# Patient Record
Sex: FEMALE | Race: WHITE | NOT HISPANIC OR LATINO | Employment: OTHER | URBAN - METROPOLITAN AREA
[De-identification: names, ages, dates, MRNs, and addresses within clinical notes are randomized per-mention and may not be internally consistent; named-entity substitution may affect disease eponyms.]

---

## 2025-02-07 ENCOUNTER — OFFICE VISIT (OUTPATIENT)
Dept: OTOLARYNGOLOGY | Facility: CLINIC | Age: 75
End: 2025-02-07
Payer: MEDICARE

## 2025-02-07 VITALS — WEIGHT: 151 LBS | BODY MASS INDEX: 29.64 KG/M2 | HEIGHT: 60 IN

## 2025-02-07 DIAGNOSIS — R44.8 FACIAL PRESSURE: ICD-10-CM

## 2025-02-07 DIAGNOSIS — J34.89 NASAL AND SINUS DISCHARGE: ICD-10-CM

## 2025-02-07 DIAGNOSIS — J34.89 NASAL OBSTRUCTION: ICD-10-CM

## 2025-02-07 DIAGNOSIS — J01.80 OTHER SUBACUTE SINUSITIS: Primary | ICD-10-CM

## 2025-02-07 PROBLEM — R94.31 ELECTROCARDIOGRAM ABNORMAL: Status: ACTIVE | Noted: 2022-05-11

## 2025-02-07 PROBLEM — E03.9 HYPOTHYROIDISM: Status: ACTIVE | Noted: 2020-01-27

## 2025-02-07 PROBLEM — I51.9 HEART DISEASE: Status: ACTIVE | Noted: 2023-02-07

## 2025-02-07 PROBLEM — M41.9 SCOLIOSIS: Status: ACTIVE | Noted: 2025-02-07

## 2025-02-07 PROBLEM — G44.209 TENSION-TYPE HEADACHE: Status: ACTIVE | Noted: 2020-02-20

## 2025-02-07 PROBLEM — E66.3 OVERWEIGHT WITH BODY MASS INDEX (BMI) 25.0-29.9: Status: ACTIVE | Noted: 2019-07-25

## 2025-02-07 PROBLEM — M41.55 SCOLIOSIS OF THORACOLUMBAR REGION DUE TO DEGENERATIVE DISEASE OF SPINE IN ADULT: Status: ACTIVE | Noted: 2025-02-07

## 2025-02-07 PROBLEM — M48.061 LUMBAR STENOSIS: Status: ACTIVE | Noted: 2025-02-07

## 2025-02-07 PROBLEM — J45.20 INTERMITTENT ASTHMA (HHS-HCC): Status: ACTIVE | Noted: 2020-01-27

## 2025-02-07 PROBLEM — I05.9 MITRAL VALVE DISORDER: Status: ACTIVE | Noted: 2022-06-22

## 2025-02-07 PROBLEM — K21.9 CHRONIC GERD: Status: ACTIVE | Noted: 2025-02-07

## 2025-02-07 PROBLEM — M19.90 ARTHRITIS: Status: ACTIVE | Noted: 2025-02-07

## 2025-02-07 PROBLEM — F41.9 ANXIETY: Status: ACTIVE | Noted: 2020-01-27

## 2025-02-07 PROBLEM — F32.5 MAJOR DEPRESSION IN REMISSION (CMS-HCC): Status: ACTIVE | Noted: 2020-01-27

## 2025-02-07 PROBLEM — M85.80 OSTEOPENIA: Status: ACTIVE | Noted: 2020-02-04

## 2025-02-07 PROBLEM — M43.9 ACQUIRED SPINAL DEFORMITY: Status: ACTIVE | Noted: 2025-02-07

## 2025-02-07 PROBLEM — R93.6 ABNORMAL FINDINGS ON DIAGNOSTIC IMAGING OF LIMBS: Status: ACTIVE | Noted: 2023-02-07

## 2025-02-07 PROBLEM — G25.81 RESTLESS LEGS SYNDROME (RLS): Status: ACTIVE | Noted: 2020-01-27

## 2025-02-07 PROBLEM — R06.83 SNORING: Status: ACTIVE | Noted: 2019-01-15

## 2025-02-07 PROBLEM — E55.9 VITAMIN D DEFICIENCY: Status: ACTIVE | Noted: 2020-01-27

## 2025-02-07 PROBLEM — F32.A DEPRESSION: Status: ACTIVE | Noted: 2025-02-07

## 2025-02-07 PROBLEM — I10 ESSENTIAL HYPERTENSION: Status: ACTIVE | Noted: 2023-02-07

## 2025-02-07 PROBLEM — G47.33 OBSTRUCTIVE SLEEP APNEA SYNDROME: Status: ACTIVE | Noted: 2020-01-27

## 2025-02-07 PROBLEM — G47.10 HYPERSOMNIA: Status: ACTIVE | Noted: 2018-11-09

## 2025-02-07 PROBLEM — I44.7 LEFT BUNDLE BRANCH BLOCK: Status: ACTIVE | Noted: 2022-05-11

## 2025-02-07 PROBLEM — F33.1 MODERATE RECURRENT MAJOR DEPRESSION: Status: ACTIVE | Noted: 2020-01-27

## 2025-02-07 PROCEDURE — 31231 NASAL ENDOSCOPY DX: CPT | Performed by: NURSE PRACTITIONER

## 2025-02-07 PROCEDURE — 99204 OFFICE O/P NEW MOD 45 MIN: CPT | Performed by: NURSE PRACTITIONER

## 2025-02-07 PROCEDURE — 1159F MED LIST DOCD IN RCRD: CPT | Performed by: NURSE PRACTITIONER

## 2025-02-07 PROCEDURE — 1157F ADVNC CARE PLAN IN RCRD: CPT | Performed by: NURSE PRACTITIONER

## 2025-02-07 PROCEDURE — 1160F RVW MEDS BY RX/DR IN RCRD: CPT | Performed by: NURSE PRACTITIONER

## 2025-02-07 PROCEDURE — 1036F TOBACCO NON-USER: CPT | Performed by: NURSE PRACTITIONER

## 2025-02-07 RX ORDER — LORATADINE 10 MG/1
1 TABLET ORAL DAILY
COMMUNITY

## 2025-02-07 RX ORDER — PANTOPRAZOLE SODIUM 40 MG/1
1 TABLET, DELAYED RELEASE ORAL DAILY
COMMUNITY

## 2025-02-07 RX ORDER — MAGNESIUM OXIDE/MAG AA CHELATE 300 MG
1 CAPSULE ORAL DAILY
COMMUNITY

## 2025-02-07 RX ORDER — FLUOXETINE 20 MG/1
1 TABLET ORAL DAILY
COMMUNITY

## 2025-02-07 RX ORDER — BUDESONIDE AND FORMOTEROL FUMARATE DIHYDRATE 80; 4.5 UG/1; UG/1
2 AEROSOL RESPIRATORY (INHALATION)
COMMUNITY

## 2025-02-07 RX ORDER — AMOXICILLIN AND CLAVULANATE POTASSIUM 875; 125 MG/1; MG/1
1 TABLET, FILM COATED ORAL 2 TIMES DAILY
Qty: 20 TABLET | Refills: 0 | Status: SHIPPED | OUTPATIENT
Start: 2025-02-07 | End: 2025-02-07 | Stop reason: WASHOUT

## 2025-02-07 RX ORDER — ALBUTEROL SULFATE 0.83 MG/ML
2.5 SOLUTION RESPIRATORY (INHALATION) EVERY 4 HOURS PRN
COMMUNITY

## 2025-02-07 RX ORDER — FLUTICASONE PROPIONATE 50 MCG
1 SPRAY, SUSPENSION (ML) NASAL DAILY
COMMUNITY

## 2025-02-07 RX ORDER — LORATADINE PSEUDOEPHEDRINE SULFATE 10; 240 MG/1; MG/1
1 TABLET, EXTENDED RELEASE ORAL DAILY PRN
COMMUNITY
Start: 2019-01-29

## 2025-02-07 RX ORDER — OMEPRAZOLE 20 MG/1
1 CAPSULE, DELAYED RELEASE ORAL EVERY 24 HOURS
COMMUNITY
Start: 2016-01-13

## 2025-02-07 RX ORDER — ALBUTEROL SULFATE 90 UG/1
2 INHALANT RESPIRATORY (INHALATION) 4 TIMES DAILY PRN
COMMUNITY

## 2025-02-07 RX ORDER — TOPIRAMATE 25 MG/1
1 TABLET ORAL 2 TIMES DAILY
COMMUNITY

## 2025-02-07 RX ORDER — LORAZEPAM 1 MG/1
1 TABLET ORAL EVERY 6 HOURS PRN
COMMUNITY

## 2025-02-07 RX ORDER — FLUTICASONE PROPIONATE 50 MCG
2 SPRAY, SUSPENSION (ML) NASAL DAILY
Qty: 16 G | Refills: 3 | Status: SHIPPED | OUTPATIENT
Start: 2025-02-07 | End: 2025-03-09

## 2025-02-07 RX ORDER — MULTIVIT-MIN/FA/LYCOPEN/LUTEIN .4-300-25
1 TABLET ORAL DAILY
COMMUNITY
Start: 2019-01-29

## 2025-02-07 RX ORDER — FERROUS SULFATE 325(65) MG
1 TABLET ORAL DAILY
COMMUNITY
Start: 2019-01-29

## 2025-02-07 RX ORDER — AMOXICILLIN AND CLAVULANATE POTASSIUM 875; 125 MG/1; MG/1
1 TABLET, FILM COATED ORAL 2 TIMES DAILY
Qty: 20 TABLET | Refills: 0 | Status: SHIPPED | OUTPATIENT
Start: 2025-02-07 | End: 2025-02-17

## 2025-02-07 RX ORDER — ROPINIROLE 1 MG/1
1 TABLET, FILM COATED ORAL NIGHTLY
COMMUNITY

## 2025-02-07 RX ORDER — HYDROCORTISONE 25 MG/G
CREAM TOPICAL
COMMUNITY
Start: 2024-06-05

## 2025-02-07 RX ORDER — LOSARTAN POTASSIUM 25 MG/1
1 TABLET ORAL DAILY
COMMUNITY

## 2025-02-07 RX ORDER — GLUCOSAMINE/CHONDR SU A SOD 750-600 MG
TABLET ORAL
COMMUNITY
Start: 2019-01-29

## 2025-02-07 NOTE — PROGRESS NOTES
Subjective   Patient ID: Kenyatta Domínguez is a 74 y.o. female who presents for No chief complaint on file..  HPI  Kenyatta Domínguez is a 74 y.o. old female   presenting with complaints of sinus and nose problems that began as early as January of this year.   The patient describes the sinus/nose problems as gradual onset of facial pressure, cough, nasal drainage (anterior, posterior, bright yellow to green, worse with bending over). Currently, drainage is yellow and blood tinged following doxycycline. She has mild nasal obstruction at night. Patient denies facial pain, periorbital edema, throat clearing, loss of taste, and loss of smell. The patient denies epistaxis. The patient has taken afrin at night to alleviate the problem. The medication afrin has helped. The patient has not tried nasal saline irrigations. Does not have a history of allergic rhinitis or allergies. They deny a history of aspirin sensitivity. They report 3-4 sinus infections per year requiring antibiotic treatment. Most recent antibiotic usage includes: Doxycycline x 7 days in early January. Then again doxycycline x 7 days finished 2 days ago. She did have a prednisone taper that she has 2 days remaining and a kenalog shot yesterday.    History of prior nasal/sinus surgery or procedure: Denies    I personally reviewed the patients CT scan results. I discussed the results personally with the patient. The following findings were discussed: 1/29/23: CT Head: Read reveals that paranasal sinuses are grossly clear.     Review of Systems  Review of systems is negative for constitutional, ophthalmological, cardiac, pulmonary, renal, gastrointestinal, musculoskeletal, mental health, endocrine, or neurologic disorders (except as listed in the HPI, PMH, and Problem List).     Objective   Physical Exam  CONSTITUTIONAL: Vital signs reviewed. Patient appears well developed and well nourished.   GENERAL: this is a healthy appearing female who appears stated age.  The patient is alert and appropriately verbally conversant with hoarseness. This patient is in no apparent distress.   FACE: The face was inspected and no cutaneous masses or lesions were visualized. There was no erythema or edema noted. Facial movement was symmetric. No skin lesions were detected. There was no sinus tenderness elicited. TMJ crepitus absent.   EYES: Extra-ocular muscle function was intact. No nystagmus was observed. Pupils were equal.   CRANIAL NERVES: Cranial nerves II, III, IV, and VI were noted to be intact via extra-ocular muscle movement testing. Cranial nerve VII noted to be intact and symmetric by facial movement. Cranial nerves IX and X noted to be intact by gag reflex and palatal movement. Cranial nerve XII noted to be intact by active and symmetric tongue movement.   NOSE: Examination of the nose revealed the nasal dorsum to be midline. Intranasal exam reveals the septum is deviated left. The inferior turbinates were minimal. No masses, polyps, mucopus, or other lesion on anterior rhinoscopy. See below procedure note as applicable for further exam.  ORAL CAVITY: Examination of the oral cavity revealed no mass lesions nor infection. The palate was noted to be intact. The tongue exhibited normal mobility. Mucosa was moist without lesion. The lips were free of lesion. Gums were free of inflammation. Dentition: normal without obvious infection or inflammation  OROPHARYNX: The oral pharynx was free of mass lesion or mucosal abnormality. The palate was noted to be without lesion. The uvula was normal appearing. The tonsils were Normal.  EARS: Examination of the ears revealed that the auricles were normally formed with no lesions. The external auditory canals were normal. The tympanic membranes were intact.  There is no inflammation visualized.   NECK: Visualization and palpation of the neck revealed no mass lesions. No skin lesions or inflammatory processes were detected. The cervical  musculature was normal to palpation.   CERVICAL LYMPHATICS: There were no palpable lymph nodes in the posterior triangle, submandibular triangle, jugulodigastric region, or central neck.  RESPIRATORY: Normal inspiration and expiration and chest wall expansion, no use of accessory muscles to breathe, no stridor.  NEUROLOGICAL: Patient is ambulatory without assist. Mentation is clear. Answering questions appropriately.     Nasal / sinus endoscopy    Date/Time: 2/7/2025 10:12 AM    Performed by: KEVIN Contreras  Authorized by: KEVIN Contreras    Consent:     Consent obtained:  Verbal    Consent given by:  Patient    Risks discussed:  Bleeding, infection and pain    Alternatives discussed:  No treatment, observation and delayed treatment  Procedure details:     Indications: assessment of airway and sino-nasal symptoms      Medication:  Afrin and [place]ich[place]-Synephrine 2%    Instrument: flexible fiberoptic nasal endoscope      Scope location: bilateral nare    Post-procedure details:     Patient tolerance of procedure:  Tolerated well, no immediate complications  Comments:      Findings: After topical decongestion with decongestant and anesthetic spray, nasal endoscopy was performed using an endoscope. The septum was mild left dev. The inferior turbinates were minimal.  The middle turbinates appeared healthy, the middle meatus is free of purulence, masses, lesions or polyps on the right, although there is a clear drainage here. On the left, there is a thick purulent drainage from the middle meatus, draining down the posterior pharynx. The superior meatus and sphenoethmoid recess are clear bilaterally. The nasal passageway is patent. The nasopharynx was clear. There were no complications and the patient tolerated the procedure well.        Assessment/Plan     ASSESSMENT:  Kenyatta Domínguez is a 74 y.o. year old female with symptoms of nasal drainage, nasal obstruction and facial pressure and clinical  findings consistent with acute sinusitis. Patient also noted to have a mild left nasal septal deviation. Recommended a 10 day course of augmentin, saline irrigations and fluticasone.      PLAN:  1) Nasal Endoscopy today.Findings: mild left dev, purulent drainage from left middle meatus, clear drainage from right middle meatus.  2) I personally reviewed the patients CT scan results. I discussed the results personally with the patient. The following findings were discussed: 1/29/23: CT Head: Read reveals that paranasal sinuses are grossly clear.   Reassurance and counseling provided to patient and his condition was extensively discussed including as noted below:  3) We will start the patient on antibiotics to treat current infection. Rx for Augmentin BID x 10 days. Advised to take with food and discontinue for adverse effects. Highly recommended use of probiotic.  4) Patient instructed to begin saline irrigations, ideally 1-2 times daily. Discussed appropriate technique and provided with a sample bottle.   5) Patient was instructed to start steroid nasal spray after the irrigations.  6) The patient will follow up in 1 month or sooner if needed to determine further steps in care.

## 2025-02-07 NOTE — PATIENT INSTRUCTIONS
Today you were evaluated by Melvi Ghotra CNP.    Please follow-up in 4 weeks or sooner if needed. If you have any questions or concerns, please contact my office at [phone].     Begin Augmentin, twice daily, x 10 days. Take with a probiotic and yogurt. Discontinue for adverse effects.    Saline Irrigations:  The Benefits:  When you irrigate, the isotonic saline (salt water) washes mucous, crusts, and other debris from your nose (allergens, irritants from the environment, etc) that could be contributing to your nasal symptoms.     Instructions: Stand over the sink with your head forward over the sink or in the bathtub to prevent water from going down into your throat. The goal is to get the irrigation back out of the opposite nostril after irrigation. Irrigate each side of the nose with 4 oz (about 120 milliliters) 1-3 times/day. You may buy the salt packets that come with the [employer] irrigation bottle or use recipe provided below to make your own nasal saline. Irrigate each side of the nose with mild force/squeezing of bottle. If you feel like the solution is going into your ears adjust your head angle or use less force with the bottle. The first couple times you use the solution your nose may burn a bit but this will go away with time.    Recipe to Make Own Nasal Saline Solution   Mix 8 oz of tap water (be sure to boil it then let it cool down) or distilled water with 1/2 tsp of baking soda and 1/2 tsp of table salt and shake bottle to dissolve.    · If you are using a steroid nasal spray in addition to the irrigation, irrigate first, then use the topical steroid spray otherwise you will wash the steroid out of your nose with the irrigation    Optimal use is twice per day  · Irrigations have been proven to be more effective compared with salt water spray   Nasal irrigations performed with a large volume and delivered with low positive pressure are more effective than saline sprays over an 8-week period  for treatment of chronic nasal and sinus symptoms1    1.  María ELLIS et al Nasal saline irrigations for the symptoms of acute and chronic rhinosinusitis.  Arch OtolaryngolHeadNeckSurg. 2007;133(11):7176-6818.    MEDICATED NASAL SPRAY INSTRUCTIONS  Please take the prescribed nasal spray as directed. BE SURE TO POINT THE SPRAY TOWARDS THE CORNER OF THE EYE ON THE SAME SIDE NOSTRIL. This will ensure you are treating the appropriate parts of your nose that are swollen or inflamed.  This medication will take upwards of one month before you notice full benefit. You MUST use it every day for it to be effective.

## 2025-02-07 NOTE — LETTER
February 7, 2025     Niurka Littlejohn MD  [address] [address]    Patient: Kenyatta Domínguez   YOB: 1950   Date of Visit: 2/7/2025       Dear Dr. Niurka Littlejohn MD:    Thank you for referring Kenyatta Domínguez to me for evaluation. Below are my notes for this consultation.  If you have questions, please do not hesitate to call me. I look forward to following your patient along with you.       Sincerely,     Melvi Ghotra, APRN-CNP      CC: No Recipients  ______________________________________________________________________________________    Subjective  Patient ID: Kenyatta Domínguez is a 74 y.o. female who presents for No chief complaint on file..  HPI  Kenyatta Domínguez is a 74 y.o. old female   presenting with complaints of sinus and nose problems that began as early as January of this year.   The patient describes the sinus/nose problems as gradual onset of facial pressure, cough, nasal drainage (anterior, posterior, bright yellow to green, worse with bending over). Currently, drainage is yellow and blood tinged following doxycycline. She has mild nasal obstruction at night. Patient denies facial pain, periorbital edema, throat clearing, loss of taste, and loss of smell. The patient denies epistaxis. The patient has taken afrin at night to alleviate the problem. The medication afrin has helped. The patient has not tried nasal saline irrigations. Does not have a history of allergic rhinitis or allergies. They deny a history of aspirin sensitivity. They report 3-4 sinus infections per year requiring antibiotic treatment. Most recent antibiotic usage includes: Doxycycline x 7 days in early January. Then again doxycycline x 7 days finished 2 days ago. She did have a prednisone taper that she has 2 days remaining and a kenalog shot yesterday.    History of prior nasal/sinus surgery or procedure: Denies    I personally reviewed the patients CT scan results. I  discussed the results personally with the patient. The following findings were discussed: 1/29/23: CT Head: Read reveals that paranasal sinuses are grossly clear.     Review of Systems  Review of systems is negative for constitutional, ophthalmological, cardiac, pulmonary, renal, gastrointestinal, musculoskeletal, mental health, endocrine, or neurologic disorders (except as listed in the HPI, PMH, and Problem List).     Objective  Physical Exam  CONSTITUTIONAL: Vital signs reviewed. Patient appears well developed and well nourished.   GENERAL: this is a healthy appearing female who appears stated age. The patient is alert and appropriately verbally conversant with hoarseness. This patient is in no apparent distress.   FACE: The face was inspected and no cutaneous masses or lesions were visualized. There was no erythema or edema noted. Facial movement was symmetric. No skin lesions were detected. There was no sinus tenderness elicited. TMJ crepitus absent.   EYES: Extra-ocular muscle function was intact. No nystagmus was observed. Pupils were equal.   CRANIAL NERVES: Cranial nerves II, III, IV, and VI were noted to be intact via extra-ocular muscle movement testing. Cranial nerve VII noted to be intact and symmetric by facial movement. Cranial nerves IX and X noted to be intact by gag reflex and palatal movement. Cranial nerve XII noted to be intact by active and symmetric tongue movement.   NOSE: Examination of the nose revealed the nasal dorsum to be midline. Intranasal exam reveals the septum is deviated left. The inferior turbinates were minimal. No masses, polyps, mucopus, or other lesion on anterior rhinoscopy. See below procedure note as applicable for further exam.  ORAL CAVITY: Examination of the oral cavity revealed no mass lesions nor infection. The palate was noted to be intact. The tongue exhibited normal mobility. Mucosa was moist without lesion. The lips were free of lesion. Gums were free of  inflammation. Dentition: normal without obvious infection or inflammation  OROPHARYNX: The oral pharynx was free of mass lesion or mucosal abnormality. The palate was noted to be without lesion. The uvula was normal appearing. The tonsils were Normal.  EARS: Examination of the ears revealed that the auricles were normally formed with no lesions. The external auditory canals were normal. The tympanic membranes were intact.  There is no inflammation visualized.   NECK: Visualization and palpation of the neck revealed no mass lesions. No skin lesions or inflammatory processes were detected. The cervical musculature was normal to palpation.   CERVICAL LYMPHATICS: There were no palpable lymph nodes in the posterior triangle, submandibular triangle, jugulodigastric region, or central neck.  RESPIRATORY: Normal inspiration and expiration and chest wall expansion, no use of accessory muscles to breathe, no stridor.  NEUROLOGICAL: Patient is ambulatory without assist. Mentation is clear. Answering questions appropriately.     Nasal / sinus endoscopy    Date/Time: 2/7/2025 10:12 AM    Performed by: KEVIN Contreras  Authorized by: KEVIN Contreras    Consent:     Consent obtained:  Verbal    Consent given by:  Patient    Risks discussed:  Bleeding, infection and pain    Alternatives discussed:  No treatment, observation and delayed treatment  Procedure details:     Indications: assessment of airway and sino-nasal symptoms      Medication:  Afrin and Jericho-Synephrine 2%    Instrument: flexible fiberoptic nasal endoscope      Scope location: bilateral nare    Post-procedure details:     Patient tolerance of procedure:  Tolerated well, no immediate complications  Comments:      Findings: After topical decongestion with decongestant and anesthetic spray, nasal endoscopy was performed using an endoscope. The septum was mild left dev. The inferior turbinates were minimal.  The middle turbinates appeared  None healthy, the middle meatus is free of purulence, masses, lesions or polyps on the right, although there is a clear drainage here. On the left, there is a thick purulent drainage from the middle meatus, draining down the posterior pharynx. The superior meatus and sphenoethmoid recess are clear bilaterally. The nasal passageway is patent. The nasopharynx was clear. There were no complications and the patient tolerated the procedure well.        Assessment/Plan    ASSESSMENT:  Kenyatta Domínguez is a 74 y.o. year old female with symptoms of nasal drainage, nasal obstruction and facial pressure and clinical findings consistent with acute sinusitis. Patient also noted to have a mild left nasal septal deviation. Recommended a 10 day course of augmentin, saline irrigations and fluticasone.      PLAN:  1) Nasal Endoscopy today.Findings: mild left dev, purulent drainage from left middle meatus, clear drainage from right middle meatus.  2) I personally reviewed the patients CT scan results. I discussed the results personally with the patient. The following findings were discussed: 1/29/23: CT Head: Read reveals that paranasal sinuses are grossly clear.   Reassurance and counseling provided to patient and his condition was extensively discussed including as noted below:  3) We will start the patient on antibiotics to treat current infection. Rx for Augmentin BID x 10 days. Advised to take with food and discontinue for adverse effects. Highly recommended use of probiotic.  4) Patient instructed to begin saline irrigations, ideally 1-2 times daily. Discussed appropriate technique and provided with a sample bottle.   5) Patient was instructed to start steroid nasal spray after the irrigations.  6) The patient will follow up in 1 month or sooner if needed to determine further steps in care.

## 2025-03-07 ENCOUNTER — APPOINTMENT (OUTPATIENT)
Dept: OTOLARYNGOLOGY | Facility: CLINIC | Age: 75
End: 2025-03-07
Payer: MEDICARE

## 2025-03-07 VITALS
WEIGHT: 151 LBS | SYSTOLIC BLOOD PRESSURE: 154 MMHG | HEART RATE: 67 BPM | DIASTOLIC BLOOD PRESSURE: 85 MMHG | BODY MASS INDEX: 29.49 KG/M2

## 2025-03-07 DIAGNOSIS — J34.89 NASAL OBSTRUCTION: ICD-10-CM

## 2025-03-07 DIAGNOSIS — J34.89 NASAL AND SINUS DISCHARGE: Primary | ICD-10-CM

## 2025-03-07 DIAGNOSIS — J31.0 CHRONIC RHINITIS: ICD-10-CM

## 2025-03-07 PROCEDURE — 3079F DIAST BP 80-89 MM HG: CPT | Performed by: NURSE PRACTITIONER

## 2025-03-07 PROCEDURE — 1157F ADVNC CARE PLAN IN RCRD: CPT | Performed by: NURSE PRACTITIONER

## 2025-03-07 PROCEDURE — 1036F TOBACCO NON-USER: CPT | Performed by: NURSE PRACTITIONER

## 2025-03-07 PROCEDURE — 1159F MED LIST DOCD IN RCRD: CPT | Performed by: NURSE PRACTITIONER

## 2025-03-07 PROCEDURE — 3077F SYST BP >= 140 MM HG: CPT | Performed by: NURSE PRACTITIONER

## 2025-03-07 PROCEDURE — 31231 NASAL ENDOSCOPY DX: CPT | Performed by: NURSE PRACTITIONER

## 2025-03-07 PROCEDURE — 99213 OFFICE O/P EST LOW 20 MIN: CPT | Performed by: NURSE PRACTITIONER

## 2025-03-07 PROCEDURE — 1160F RVW MEDS BY RX/DR IN RCRD: CPT | Performed by: NURSE PRACTITIONER

## 2025-03-07 RX ORDER — VITAMIN E (DL,TOCOPHERYL ACET) 180 MG
CAPSULE ORAL
COMMUNITY

## 2025-03-07 RX ORDER — FLUCONAZOLE 150 MG/1
150 TABLET ORAL ONCE
COMMUNITY
Start: 2025-02-28

## 2025-03-07 RX ORDER — CEPHALEXIN 500 MG/1
500 CAPSULE ORAL
COMMUNITY
Start: 2025-03-05

## 2025-03-07 NOTE — PROGRESS NOTES
Subjective   Patient ID: Kenyatta Domínguez is a 74 y.o. female who presents for No chief complaint on file..  HPI  3/7/25- Patient presents for follow-up. She notes that she feels much better since last visit. She has minimal drainage when blowing her nose, but this is clear now. Her pressure is alleviated. She is still on the fluticasone.     Review of Systems  Review of systems is negative for constitutional, ophthalmological, cardiac, pulmonary, renal, gastrointestinal, musculoskeletal, mental health, endocrine, or neurologic disorders (except as listed in the HPI, PMH, and Problem List).     Objective   Physical Exam  CONSTITUTIONAL: Patient appears well developed and well nourished.   GENERAL: this is a healthy appearing female who appears stated age. The patient is alert and appropriately verbally conversant with hoarseness. This patient is in no apparent distress.   FACE: The face was inspected and no cutaneous masses or lesions were visualized. There was no erythema or edema noted. Facial movement was symmetric. No skin lesions were detected.   EYES: Extra-ocular muscle function was intact. No nystagmus was observed. Pupils were equal.   NOSE: Examination of the nose revealed the nasal dorsum to be midline. Intranasal exam reveals the septum is deviated left. The inferior turbinates were minimal. No masses, polyps, mucopus, or other lesion on anterior rhinoscopy. See below procedure note as applicable for further exam.    RESPIRATORY: Normal inspiration and expiration and chest wall expansion, no use of accessory muscles to breathe, no stridor.  NEUROLOGICAL: Patient is ambulatory without assist. Mentation is clear. Answering questions appropriately.     Nasal / sinus endoscopy    Date/Time: 3/7/2025 11:00 AM    Performed by: KEVIN Contreras  Authorized by: KEVIN Contreras    Consent:     Consent obtained:  Verbal    Consent given by:  Patient    Risks discussed:  Bleeding,  infection and pain    Alternatives discussed:  No treatment, observation and delayed treatment  Procedure details:     Indications: assessment of airway and sino-nasal symptoms      Medication:  Afrin and Jericho-Synephrine 2%    Instrument: flexible fiberoptic nasal endoscope      Scope location: bilateral nare    Post-procedure details:     Patient tolerance of procedure:  Tolerated well, no immediate complications  Comments:      Findings: After topical decongestion with decongestant and anesthetic spray, nasal endoscopy was performed using an endoscope. The septum was mild left dev. The inferior turbinates were minimal.  The middle turbinates appeared healthy, the middle meatus is free of purulence, masses, lesions or polyps. The superior meatus and sphenoethmoid recess are clear bilaterally. The nasal passageway is patent. The nasopharynx was clear. There were no complications and the patient tolerated the procedure well.        Assessment/Plan     ASSESSMENT:  Kenyatta Domínguez is a 74 y.o. year old female with symptoms of nasal drainage, nasal obstruction and facial pressure and clinical findings consistent with acute sinusitis. Patient also noted to have a mild left nasal septal deviation. Recommended a 10 day course of augmentin, saline irrigations and fluticasone.  3/7/25- Resolved infection. Continue flonase as needed.      PLAN:  1) Nasal Endoscopy today.Findings: mild left dev, clear drainage from left middle meatus.  2) I personally reviewed the patients CT scan results. I discussed the results personally with the patient. The following findings were discussed: 1/29/23: CT Head: Read reveals that paranasal sinuses are grossly clear.   Reassurance and counseling provided to patient and his condition was extensively discussed including as noted below:  3) Patient was instructed to continue steroid nasal spray. She may taper and use as needed.  4) The patient will follow up with me as needed if symptoms  return.

## 2025-03-07 NOTE — PATIENT INSTRUCTIONS
Today you were evaluated by Melvi Ghotra CNP.    Please follow-up as needed. If you have any questions or concerns, please contact my office at [phone].     You may taper off flonase.    Follow up if needed.

## 2025-04-15 ENCOUNTER — OFFICE VISIT (OUTPATIENT)
Dept: PULMONOLOGY | Facility: CLINIC | Age: 75
End: 2025-04-15
Payer: MEDICARE

## 2025-04-15 ENCOUNTER — TELEPHONE (OUTPATIENT)
Dept: PULMONOLOGY | Facility: CLINIC | Age: 75
End: 2025-04-15

## 2025-04-15 VITALS
BODY MASS INDEX: 28.83 KG/M2 | TEMPERATURE: 97.2 F | OXYGEN SATURATION: 97 % | WEIGHT: 147.6 LBS | RESPIRATION RATE: 18 BRPM | DIASTOLIC BLOOD PRESSURE: 80 MMHG | SYSTOLIC BLOOD PRESSURE: 153 MMHG | HEART RATE: 91 BPM

## 2025-04-15 DIAGNOSIS — J45.40 MODERATE PERSISTENT ASTHMA WITHOUT COMPLICATION (HHS-HCC): Primary | ICD-10-CM

## 2025-04-15 DIAGNOSIS — B99.9 RECURRENT INFECTIONS: ICD-10-CM

## 2025-04-15 PROCEDURE — 1157F ADVNC CARE PLAN IN RCRD: CPT | Performed by: INTERNAL MEDICINE

## 2025-04-15 PROCEDURE — 99215 OFFICE O/P EST HI 40 MIN: CPT | Performed by: INTERNAL MEDICINE

## 2025-04-15 PROCEDURE — 99205 OFFICE O/P NEW HI 60 MIN: CPT | Performed by: INTERNAL MEDICINE

## 2025-04-15 PROCEDURE — 1159F MED LIST DOCD IN RCRD: CPT | Performed by: INTERNAL MEDICINE

## 2025-04-15 PROCEDURE — 1160F RVW MEDS BY RX/DR IN RCRD: CPT | Performed by: INTERNAL MEDICINE

## 2025-04-15 PROCEDURE — 3079F DIAST BP 80-89 MM HG: CPT | Performed by: INTERNAL MEDICINE

## 2025-04-15 PROCEDURE — 3077F SYST BP >= 140 MM HG: CPT | Performed by: INTERNAL MEDICINE

## 2025-04-15 PROCEDURE — 1036F TOBACCO NON-USER: CPT | Performed by: INTERNAL MEDICINE

## 2025-04-15 RX ORDER — ALBUTEROL SULFATE 90 UG/1
1 INHALANT RESPIRATORY (INHALATION) ONCE
OUTPATIENT
Start: 2025-04-15

## 2025-04-15 RX ORDER — BUDESONIDE AND FORMOTEROL FUMARATE DIHYDRATE 160; 4.5 UG/1; UG/1
2 AEROSOL RESPIRATORY (INHALATION)
Qty: 10.2 G | Refills: 11 | Status: SHIPPED | OUTPATIENT
Start: 2025-04-15 | End: 2026-04-15

## 2025-04-15 RX ORDER — DOXYCYCLINE HYCLATE 50 MG/1
CAPSULE, GELATIN COATED ORAL
COMMUNITY
Start: 2025-04-10

## 2025-04-15 RX ORDER — PREDNISONE 10 MG/1
TABLET ORAL
COMMUNITY
Start: 2025-04-10

## 2025-04-15 RX ORDER — ALBUTEROL SULFATE 0.83 MG/ML
3 SOLUTION RESPIRATORY (INHALATION) ONCE
OUTPATIENT
Start: 2025-04-15 | End: 2025-04-15

## 2025-04-15 RX ORDER — ASCORBIC ACID 125 MG
TABLET,CHEWABLE ORAL
COMMUNITY

## 2025-04-15 RX ORDER — BUDESONIDE AND FORMOTEROL FUMARATE DIHYDRATE 160; 4.5 UG/1; UG/1
2 AEROSOL RESPIRATORY (INHALATION)
Qty: 10.2 G | Refills: 5 | Status: SHIPPED | OUTPATIENT
Start: 2025-04-15 | End: 2025-04-15 | Stop reason: WASHOUT

## 2025-04-15 ASSESSMENT — ENCOUNTER SYMPTOMS
ABDOMINAL PAIN: 0
RHINORRHEA: 0
ADENOPATHY: 0
AGITATION: 0
WEAKNESS: 0
ABDOMINAL DISTENTION: 0
FACIAL SWELLING: 0
SLEEP DISTURBANCE: 0
ARTHRALGIAS: 0
UNEXPECTED WEIGHT CHANGE: 0
TREMORS: 0
LIGHT-HEADEDNESS: 0
EYE DISCHARGE: 0
NERVOUS/ANXIOUS: 0
HEADACHES: 0
CONSTIPATION: 0
BRUISES/BLEEDS EASILY: 0
FATIGUE: 0
DIZZINESS: 0
APNEA: 0
NUMBNESS: 0
CHOKING: 0
SINUS PAIN: 0
DYSURIA: 0
HEMATURIA: 0
SHORTNESS OF BREATH: 1
FEVER: 0
SINUS PRESSURE: 0
PALPITATIONS: 0
JOINT SWELLING: 0
WHEEZING: 1
STRIDOR: 0
FREQUENCY: 0
SPEECH DIFFICULTY: 0
DIFFICULTY URINATING: 0
NAUSEA: 0
COUGH: 0
EYE REDNESS: 0

## 2025-04-15 NOTE — PATIENT INSTRUCTIONS
I would stop the Symbicort 80/4.5 and switch to 160/4.5, 2 puffs twice a day.  Gargle after use    I would schedule complete pulmonary function test and a fractional exhalation of nitric oxide by calling     In addition, a week after finishing the prednisone will obtain allergy labs    Will continue with the nebulization treatment every 4-6 hours as needed

## 2025-04-15 NOTE — TELEPHONE ENCOUNTER
Budesonide/Formoterol or brand name Symbicort is not covered under the insurance.    Breyna 160-4.5mcg appears to be covered. Will this work for her?    If so, please send over a new script. Thanks

## 2025-04-15 NOTE — LETTER
April 15, 2025     Niurka Littlejohn MD  [address] [address]    Patient: Kenyatta Domínguez   YOB: 1950   Date of Visit: 4/15/2025       Dear Dr. Niurka Littlejohn MD:    Thank you for referring Kenyatta Domínguez to me for evaluation. Below are my notes for this consultation.  If you have questions, please do not hesitate to call me. I look forward to following your patient along with you.       Sincerely,     Joe Alfonso MD MPH      CC: No Recipients  ______________________________________________________________________________________    @PULMONARY CONSULTATION@         Reason for Consult:  asthma     ASSESSMENT:   The patient is a 74-year-old with a history of a left bundle branch block, hypertension, vertigo and thyroid nodules.  She has had mild intermittent asthma for the most part but over the last 6 months or  year she has had flaring every several months particularly in relationship to traveling.  She has only been using her Symbicort 80/4.5 only once a day.  She had increasing respiratory flaring last week which led to her being hospitalized for several days.  It is not clear if she has any eosinophilia or elevated IgE levels.  She has dry skin and possible eczema without aspirin allergy.  She does have a lot of sinus congestion.  Currently she is 80% better and has only a minimal wheeze on forced expiration.  Her Symbicort probably should be intensified and checking for eosinophilia and elevated IgE levels will be important when she is off her steroid.  Also probably obtaining complete PFTs to better characterize the extent of her airflow obstruction is important.  Her recent chest x-ray reportedly was clear    PLAN:   Records from her recent hospitalization will be forwarded.  She will increase her Symbicort to 160/4.52 puffs twice daily and gargle after use.  Will check a respiratory allergy panel and CBC with differential when she has been  off the steroids for about a week.  In addition we will obtain quantitative immunoglobulins because of her recurrent respiratory tract infection.  Will obtain complete PFTs and FeNO level.  She will come back in 2 to 3 weeks.        HISTORY OF PRESENT ILLNESS:       The patient is a 74-year-old with a history of hypertension, left bundle branch block sleep apnea thyroid nodules and vertigo. She also has a left bundle branch block and a history of asthma.  She is described shortness of breath for period time and has been on Symbicort.  It was reported that pulmonary function test from 2019 were normal as was his CT scan.  She is said to be a never smoker.  A calcium score from June 17, 2022 revealed a score of 0 in the lower lung fields were clear.  No adenopathy was noted.    A nuclear stress test from February 1, 2023 revealed no ischemia with a normal ventricle.  The right ventricle is normal.  An echocardiogram from January 31, 2023 revealed the following  - The left ventricle is normal in size. There is mild upper septal left   ventricular hypertrophy. Left ventricular systolic function is mildly decreased.   EF = 50 ± 5% (visual est.)   - Abnormal septal motion due to LBBB.   - The right ventricle is normal in size. Right ventricular systolic function is   normal.   - Normal left and right atrial size.   - No significant valve functional abnormalities.       Currently, the patient reports that she has had asthma intermittently for many years but it only been borderline in degree.  However, she and her sister report that over the last year or so and it seems like whenever she travels she gets respiratory tract infections.  She has been on more intensive therapies for asthma every several months of late.  At times she even has had pneumonia.  She started developing increasing respiratory symptoms about a week ago and was started on doxycycline thereafter she was admitted to the hospital for test several days and  discharged 2 days ago.  She was discharged on prednisone 20 mg a day for 5 days.  She received IV corticosteroids during the hospital course at [facility] and her restless leg syndrome worsened.  She is a never smoker but did have secondhand smoke exposure from her mother who did die of lung cancer.  The patient had a cat in the past to which she was not allergic.  She does have sinus congestion and is not allergic to aspirin.  She has not had any occupational exposures that would cause asthma.  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  It is reported that her daughter has asthma.          Allergies   Allergen Reactions   • Levofloxacin Unknown        PAST MEDICAL HISTORY:   history of hypertension, left bundle branch block sleep apnea thyroid nodules and vertigo. She also has a left bundle branch block and a history of asthma    Current Outpatient Medications:   •  albuterol 2.5 mg /3 mL (0.083 %) nebulizer solution, Take 3 mL (2.5 mg) by nebulization every 4 hours if needed., Disp: , Rfl:   •  albuterol 90 mcg/actuation inhaler, Take 2 puffs by mouth 4 times a day as needed., Disp: , Rfl:   •  ascorbic acid-vitamin E-biotin (Hair, Skin, Nails with Biotin) 7.5-7.5-1,250 mg-unit-mcg tablet,chewable, Chew., Disp: , Rfl:   •  doxycycline (Vibramycin) 50 mg capsule, TAKE THREE CAPSULES BY MOUTH TWICE DAILY FOR 7 DAYS, Disp: , Rfl:   •  FLUoxetine (PROzac) 20 mg tablet, Take 1 tablet (20 mg) by mouth once daily., Disp: , Rfl:   •  fluticasone (Flonase Allergy Relief) 50 mcg/actuation nasal spray, Administer 1 spray into each nostril once daily., Disp: , Rfl:   •  hydrocortisone 2.5 % cream, APPLY 1 GRAM TOPICALLY TO THE AFFECTED AREA EVERY DAY AS DIRECTED, Disp: , Rfl:   •  LORazepam (Ativan) 1 mg tablet, Take 1 tablet (1 mg) by mouth every 6 hours if needed., Disp: , Rfl:   •  losartan (Cozaar) 25 mg tablet, Take 1 tablet (25 mg) by mouth once daily., Disp: , Rfl:   •  magnesium oxide-Mg AA chelate (Magnesium,  oxide/AA chelate,) 300 mg capsule, Take 1 capsule (300 mg) by mouth once daily., Disp: , Rfl:   •  melatonin 5 mg tablet,chewable, Chew., Disp: , Rfl:   •  multivitamin with minerals iron-free (Centrum Silver), Take 1 tablet by mouth once daily., Disp: , Rfl:   •  pantoprazole (ProtoNix) 40 mg EC tablet, Take 1 tablet (40 mg) by mouth once daily., Disp: , Rfl:   •  predniSONE (Deltasone) 10 mg tablet, TAKE 4 TABLETS BY MOUTH DAILY FOR 3 DAYS  3 TABLETS DAILY FOR 3 DAYS  2 TABLETS DAILY FOR 3 DAYS  1 TABLET DAILY FOR 3 DAYS, Disp: , Rfl:   •  rOPINIRole (Requip) 1 mg tablet, Take 1 tablet (1 mg) by mouth once daily at bedtime., Disp: , Rfl:   •  budesonide-formoterol (Symbicort) 160-4.5 mcg/actuation inhaler, Inhale 2 puffs 2 times a day. Rinse mouth with water after use to reduce aftertaste and incidence of candidiasis. Do not swallow., Disp: 10.2 g, Rfl: 5  •  fluticasone (Flonase) 50 mcg/actuation nasal spray, Administer 2 sprays into each nostril once daily. Shake gently. Before first use, prime pump. After use, clean tip and replace cap., Disp: 16 g, Rfl: 3  •  omeprazole (PriLOSEC) 20 mg DR capsule, Take 1 capsule (20 mg) by mouth once every 24 hours., Disp: , Rfl:   •  topiramate (Topamax) 25 mg tablet, Take 1 tablet (25 mg) by mouth 2 times a day., Disp: , Rfl:      FAMILY HISTORY:   Daughter has asthma and mother was a smoker and had lung cancer.  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    Review of Systems   Constitutional:  Negative for fatigue, fever and unexpected weight change.   HENT:  Negative for congestion, facial swelling, nosebleeds, postnasal drip, rhinorrhea, sinus pressure and sinus pain.    Eyes:  Negative for discharge, redness and visual disturbance.   Respiratory:  Positive for shortness of breath and wheezing. Negative for apnea, cough, choking and stridor.     Cardiovascular:  Negative for chest pain, palpitations and leg swelling.   Gastrointestinal:  Negative for abdominal distention, abdominal pain, constipation and nausea.   Endocrine: Negative for cold intolerance and heat intolerance.   Genitourinary:  Negative for difficulty urinating, dysuria, frequency and hematuria.   Musculoskeletal:  Negative for arthralgias, gait problem and joint swelling.        History of back surgery   Allergic/Immunologic: Negative for environmental allergies, food allergies and immunocompromised state.   Neurological:  Negative for dizziness, tremors, syncope, speech difficulty, weakness, light-headedness, numbness and headaches.   Hematological:  Negative for adenopathy. Does not bruise/bleed easily.   Psychiatric/Behavioral:  Negative for agitation, behavioral problems and sleep disturbance. The patient is not nervous/anxious.         Vitals:    04/15/25 0843   BP: 153/80   Pulse: 91   Resp: 18   Temp: 36.2 °C (97.2 °F)   SpO2: 97%        Physical Exam  Vitals reviewed.   Constitutional:       Appearance: Normal appearance.   HENT:      Head: Normocephalic and atraumatic.   Eyes:      Extraocular Movements: Extraocular movements intact.   Cardiovascular:      Rate and Rhythm: Normal rate and regular rhythm.      Heart sounds: No murmur heard.     No friction rub. No gallop.   Pulmonary:      Effort: Pulmonary effort is normal. No respiratory distress.      Breath sounds: No stridor. Wheezing present. No rhonchi or rales.      Comments: Only mild wheezing on forced expiration  Chest:      Chest wall: No tenderness.   Abdominal:      General: Abdomen is flat. There is no distension.      Palpations: Abdomen is soft. There is no mass.      Tenderness: There is no abdominal tenderness.   Musculoskeletal:         General: Normal range of motion.      Cervical back: Normal range of motion.      Right lower leg: No edema.      Left lower leg: No edema.   Skin:     General: Skin is warm and  dry.   Neurological:      Mental Status: She is alert and oriented to person, place, and time.   Psychiatric:         Mood and Affect: Mood normal.         Behavior: Behavior normal.

## 2025-04-15 NOTE — PROGRESS NOTES
@PULMONARY CONSULTATION@         Reason for Consult:  asthma     ASSESSMENT:   The patient is a 74-year-old with a history of a left bundle branch block, hypertension, vertigo and thyroid nodules.  She has had mild intermittent asthma for the most part but over the last 6 months or  year she has had flaring every several months particularly in relationship to traveling.  She has only been using her Symbicort 80/4.5 only once a day.  She had increasing respiratory flaring last week which led to her being hospitalized for several days.  It is not clear if she has any eosinophilia or elevated IgE levels.  She has dry skin and possible eczema without aspirin allergy.  She does have a lot of sinus congestion.  Currently she is 80% better and has only a minimal wheeze on forced expiration.  Her Symbicort probably should be intensified and checking for eosinophilia and elevated IgE levels will be important when she is off her steroid.  Also probably obtaining complete PFTs to better characterize the extent of her airflow obstruction is important.  Her recent chest x-ray reportedly was clear    PLAN:   Records from her recent hospitalization will be forwarded.  She will increase her Symbicort to 160/4.52 puffs twice daily and gargle after use.  Will check a respiratory allergy panel and CBC with differential when she has been off the steroids for about a week.  In addition we will obtain quantitative immunoglobulins because of her recurrent respiratory tract infection.  Will obtain complete PFTs and FeNO level.  She will come back in 2 to 3 weeks.        HISTORY OF PRESENT ILLNESS:       The patient is a 74-year-old with a history of hypertension, left bundle branch block sleep apnea thyroid nodules and vertigo. She also has a left bundle branch block and a history of asthma.  She is described shortness of breath for period time and has been on Symbicort.  It was reported that pulmonary function test from 2019 were normal  as was his CT scan.  She is said to be a never smoker.  A calcium score from June 17, 2022 revealed a score of 0 in the lower lung fields were clear.  No adenopathy was noted.    A nuclear stress test from February 1, 2023 revealed no ischemia with a normal ventricle.  The right ventricle is normal.  An echocardiogram from January 31, 2023 revealed the following  - The left ventricle is normal in size. There is mild upper septal left   ventricular hypertrophy. Left ventricular systolic function is mildly decreased.   EF = 50 ± 5% (visual est.)   - Abnormal septal motion due to LBBB.   - The right ventricle is normal in size. Right ventricular systolic function is   normal.   - Normal left and right atrial size.   - No significant valve functional abnormalities.       Currently, the patient reports that she has had asthma intermittently for many years but it only been borderline in degree.  However, she and her sister report that over the last year or so and it seems like whenever she travels she gets respiratory tract infections.  She has been on more intensive therapies for asthma every several months of late.  At times she even has had pneumonia.  She started developing increasing respiratory symptoms about a week ago and was started on doxycycline thereafter she was admitted to the hospital for test several days and discharged 2 days ago.  She was discharged on prednisone 20 mg a day for 5 days.  She received IV corticosteroids during the hospital course at [facility] and her restless leg syndrome worsened.  She is a never smoker but did have secondhand smoke exposure from her mother who did die of lung cancer.  The patient had a cat in the past to which she was not allergic.  She does have sinus congestion and is not allergic to aspirin.  She has not had any occupational exposures that would cause asthma.  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  It is reported that her daughter has asthma.           Allergies   Allergen Reactions    Levofloxacin Unknown        PAST MEDICAL HISTORY:   history of hypertension, left bundle branch block sleep apnea thyroid nodules and vertigo. She also has a left bundle branch block and a history of asthma    Current Outpatient Medications:     albuterol 2.5 mg /3 mL (0.083 %) nebulizer solution, Take 3 mL (2.5 mg) by nebulization every 4 hours if needed., Disp: , Rfl:     albuterol 90 mcg/actuation inhaler, Take 2 puffs by mouth 4 times a day as needed., Disp: , Rfl:     ascorbic acid-vitamin E-biotin (Hair, Skin, Nails with Biotin) 7.5-7.5-1,250 mg-unit-mcg tablet,chewable, Chew., Disp: , Rfl:     doxycycline (Vibramycin) 50 mg capsule, TAKE THREE CAPSULES BY MOUTH TWICE DAILY FOR 7 DAYS, Disp: , Rfl:     FLUoxetine (PROzac) 20 mg tablet, Take 1 tablet (20 mg) by mouth once daily., Disp: , Rfl:     fluticasone (Flonase Allergy Relief) 50 mcg/actuation nasal spray, Administer 1 spray into each nostril once daily., Disp: , Rfl:     hydrocortisone 2.5 % cream, APPLY 1 GRAM TOPICALLY TO THE AFFECTED AREA EVERY DAY AS DIRECTED, Disp: , Rfl:     LORazepam (Ativan) 1 mg tablet, Take 1 tablet (1 mg) by mouth every 6 hours if needed., Disp: , Rfl:     losartan (Cozaar) 25 mg tablet, Take 1 tablet (25 mg) by mouth once daily., Disp: , Rfl:     magnesium oxide-Mg AA chelate (Magnesium, oxide/AA chelate,) 300 mg capsule, Take 1 capsule (300 mg) by mouth once daily., Disp: , Rfl:     melatonin 5 mg tablet,chewable, Chew., Disp: , Rfl:     multivitamin with minerals iron-free (Centrum Silver), Take 1 tablet by mouth once daily., Disp: , Rfl:     pantoprazole (ProtoNix) 40 mg EC tablet, Take 1 tablet (40 mg) by mouth once daily., Disp: , Rfl:     predniSONE (Deltasone) 10 mg tablet, TAKE 4 TABLETS BY MOUTH DAILY FOR 3 DAYS  3 TABLETS DAILY FOR 3 DAYS  2 TABLETS DAILY FOR 3 DAYS  1 TABLET DAILY FOR 3 DAYS, Disp: , Rfl:     rOPINIRole (Requip) 1 mg tablet, Take 1 tablet (1 mg) by mouth once  daily at bedtime., Disp: , Rfl:     budesonide-formoterol (Symbicort) 160-4.5 mcg/actuation inhaler, Inhale 2 puffs 2 times a day. Rinse mouth with water after use to reduce aftertaste and incidence of candidiasis. Do not swallow., Disp: 10.2 g, Rfl: 5    fluticasone (Flonase) 50 mcg/actuation nasal spray, Administer 2 sprays into each nostril once daily. Shake gently. Before first use, prime pump. After use, clean tip and replace cap., Disp: 16 g, Rfl: 3    omeprazole (PriLOSEC) 20 mg DR capsule, Take 1 capsule (20 mg) by mouth once every 24 hours., Disp: , Rfl:     topiramate (Topamax) 25 mg tablet, Take 1 tablet (25 mg) by mouth 2 times a day., Disp: , Rfl:      FAMILY HISTORY:   Daughter has asthma and mother was a smoker and had lung cancer.  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    Review of Systems   Constitutional:  Negative for fatigue, fever and unexpected weight change.   HENT:  Negative for congestion, facial swelling, nosebleeds, postnasal drip, rhinorrhea, sinus pressure and sinus pain.    Eyes:  Negative for discharge, redness and visual disturbance.   Respiratory:  Positive for shortness of breath and wheezing. Negative for apnea, cough, choking and stridor.    Cardiovascular:  Negative for chest pain, palpitations and leg swelling.   Gastrointestinal:  Negative for abdominal distention, abdominal pain, constipation and nausea.   Endocrine: Negative for cold intolerance and heat intolerance.   Genitourinary:  Negative for difficulty urinating, dysuria, frequency and hematuria.   Musculoskeletal:  Negative for arthralgias, gait problem and joint swelling.        History of back surgery   Allergic/Immunologic: Negative for environmental allergies, food allergies and immunocompromised state.   Neurological:  Negative for dizziness, tremors, syncope, speech difficulty, weakness,  light-headedness, numbness and headaches.   Hematological:  Negative for adenopathy. Does not bruise/bleed easily.   Psychiatric/Behavioral:  Negative for agitation, behavioral problems and sleep disturbance. The patient is not nervous/anxious.         Vitals:    04/15/25 0843   BP: 153/80   Pulse: 91   Resp: 18   Temp: 36.2 °C (97.2 °F)   SpO2: 97%        Physical Exam  Vitals reviewed.   Constitutional:       Appearance: Normal appearance.   HENT:      Head: Normocephalic and atraumatic.   Eyes:      Extraocular Movements: Extraocular movements intact.   Cardiovascular:      Rate and Rhythm: Normal rate and regular rhythm.      Heart sounds: No murmur heard.     No friction rub. No gallop.   Pulmonary:      Effort: Pulmonary effort is normal. No respiratory distress.      Breath sounds: No stridor. Wheezing present. No rhonchi or rales.      Comments: Only mild wheezing on forced expiration  Chest:      Chest wall: No tenderness.   Abdominal:      General: Abdomen is flat. There is no distension.      Palpations: Abdomen is soft. There is no mass.      Tenderness: There is no abdominal tenderness.   Musculoskeletal:         General: Normal range of motion.      Cervical back: Normal range of motion.      Right lower leg: No edema.      Left lower leg: No edema.   Skin:     General: Skin is warm and dry.   Neurological:      Mental Status: She is alert and oriented to person, place, and time.   Psychiatric:         Mood and Affect: Mood normal.         Behavior: Behavior normal.

## 2025-04-21 ENCOUNTER — HOSPITAL ENCOUNTER (OUTPATIENT)
Dept: RESPIRATORY THERAPY | Facility: HOSPITAL | Age: 75
Discharge: HOME | End: 2025-04-21
Payer: MEDICARE

## 2025-04-21 DIAGNOSIS — J45.40 MODERATE PERSISTENT ASTHMA WITHOUT COMPLICATION (HHS-HCC): ICD-10-CM

## 2025-04-21 PROCEDURE — 2500000001 HC RX 250 WO HCPCS SELF ADMINISTERED DRUGS (ALT 637 FOR MEDICARE OP): Performed by: INTERNAL MEDICINE

## 2025-04-21 PROCEDURE — 94726 PLETHYSMOGRAPHY LUNG VOLUMES: CPT

## 2025-04-21 RX ORDER — ALBUTEROL SULFATE 0.83 MG/ML
3 SOLUTION RESPIRATORY (INHALATION) ONCE
Status: COMPLETED | OUTPATIENT
Start: 2025-04-21 | End: 2025-04-21

## 2025-04-21 RX ORDER — ALBUTEROL SULFATE 90 UG/1
4 INHALANT RESPIRATORY (INHALATION) ONCE
Status: COMPLETED | OUTPATIENT
Start: 2025-04-21 | End: 2025-04-21

## 2025-04-21 RX ADMIN — ALBUTEROL SULFATE 4 PUFF: 90 AEROSOL, METERED RESPIRATORY (INHALATION) at 09:07

## 2025-04-23 LAB
A ALTERNATA IGE QN: <0.1 KU/L
A ALTERNATA IGE RAST: 0
A FUMIGATUS IGE QN: <0.1 KU/L
A FUMIGATUS IGE RAST: 0
BASOPHILS # BLD AUTO: 32 CELLS/UL (ref 0–200)
BASOPHILS NFR BLD AUTO: 0.3 %
BERMUDA GRASS IGE QN: <0.1 KU/L
BERMUDA GRASS IGE RAST: 0
BOXELDER IGE QN: 0.22 KU/L
BOXELDER IGE RAST: ABNORMAL
C HERBARUM IGE QN: <0.1 KU/L
C HERBARUM IGE RAST: 0
CALIF WALNUT POLN IGE QN: <0.1 KU/L
CALIF WALNUT POLN IGE RAST: 0
CAT (RFEL D) 1 IGE QN: 0.13 KU/L
CAT (RFEL D) 4 IGE QN: <0.1 KU/L
CAT (RFEL D) 7 IGE QN: <0.1 KU/L
CAT DANDER IGE QN: 0.12 KU/L
CAT DANDER IGE RAST: ABNORMAL
CMN PIGWEED IGE QN: <0.1 KU/L
CMN PIGWEED IGE RAST: 0
COMMON RAGWEED IGE QN: <0.1 KU/L
COMMON RAGWEED IGE RAST: 0
COTTONWOOD IGE QN: <0.1 KU/L
COTTONWOOD IGE RAST: 0
D FARINAE IGE QN: <0.1 KU/L
D FARINAE IGE RAST: 0
D PTERONYSS IGE QN: <0.1 KU/L
D PTERONYSS IGE RAST: 0
DOG DANDER IGE QN: <0.1 KU/L
DOG DANDER IGE RAST: 0
EOSINOPHIL # BLD AUTO: 300 CELLS/UL (ref 15–500)
EOSINOPHIL NFR BLD AUTO: 2.8 %
ERYTHROCYTE [DISTWIDTH] IN BLOOD BY AUTOMATED COUNT: 14 % (ref 11–15)
HCT VFR BLD AUTO: 40.4 % (ref 35–45)
HGB BLD-MCNC: 12.6 G/DL (ref 11.7–15.5)
IGA SERPL-MCNC: 140 MG/DL (ref 70–320)
IGE SERPL-ACNC: 58 KU/L
IGG SERPL-MCNC: 810 MG/DL (ref 600–1540)
IGM SERPL-MCNC: 90 MG/DL (ref 50–300)
LONDON PLANE IGE QN: <0.1 KU/L
LONDON PLANE IGE RAST: 0
LYMPHOCYTES # BLD AUTO: 2054 CELLS/UL (ref 850–3900)
LYMPHOCYTES NFR BLD AUTO: 19.2 %
MCH RBC QN AUTO: 28.3 PG (ref 27–33)
MCHC RBC AUTO-ENTMCNC: 31.2 G/DL (ref 32–36)
MCV RBC AUTO: 90.8 FL (ref 80–100)
MONOCYTES # BLD AUTO: 738 CELLS/UL (ref 200–950)
MONOCYTES NFR BLD AUTO: 6.9 %
MOUSE URINE PROT IGE QN: <0.1 KU/L
MOUSE URINE PROT IGE RAST: 0
MT JUNIPER IGE QN: <0.1 KU/L
MT JUNIPER IGE RAST: 0
NEUTROPHILS # BLD AUTO: 7576 CELLS/UL (ref 1500–7800)
NEUTROPHILS NFR BLD AUTO: 70.8 %
P NOTATUM IGE QN: <0.1 KU/L
P NOTATUM IGE RAST: 0
PECAN/HICK TREE IGE QN: <0.1 KU/L
PECAN/HICK TREE IGE RAST: 0
PLATELET # BLD AUTO: 363 THOUSAND/UL (ref 140–400)
PMV BLD REES-ECKER: 9.6 FL (ref 7.5–12.5)
QUEST CAT DANDER COMP PNL FEL D 2 (E220) IGE: <0.1 KU/L
RBC # BLD AUTO: 4.45 MILLION/UL (ref 3.8–5.1)
REF LAB TEST REF RANGE: NORMAL
ROACH IGE QN: <0.1 KU/L
ROACH IGE RAST: 0
SALTWORT IGE QN: <0.1 KU/L
SALTWORT IGE RAST: 0
SHEEP SORREL IGE QN: <0.1 KU/L
SHEEP SORREL IGE RAST: 0
SILVER BIRCH IGE QN: <0.1 KU/L
SILVER BIRCH IGE RAST: 0
TIMOTHY IGE QN: <0.1 KU/L
TIMOTHY IGE RAST: 0
WBC # BLD AUTO: 10.7 THOUSAND/UL (ref 3.8–10.8)
WHITE ASH IGE QN: <0.1 KU/L
WHITE ASH IGE RAST: 0
WHITE ELM IGE QN: <0.1 KU/L
WHITE ELM IGE RAST: 0
WHITE MULBERRY IGE QN: <0.1 KU/L
WHITE MULBERRY IGE RAST: 0
WHITE OAK IGE QN: <0.1 KU/L
WHITE OAK IGE RAST: 0

## 2025-05-06 ENCOUNTER — OFFICE VISIT (OUTPATIENT)
Dept: PULMONOLOGY | Facility: CLINIC | Age: 75
End: 2025-05-06
Payer: MEDICARE

## 2025-05-06 VITALS
RESPIRATION RATE: 16 BRPM | HEART RATE: 78 BPM | BODY MASS INDEX: 28.98 KG/M2 | OXYGEN SATURATION: 97 % | WEIGHT: 148.4 LBS | DIASTOLIC BLOOD PRESSURE: 61 MMHG | TEMPERATURE: 97.8 F | SYSTOLIC BLOOD PRESSURE: 125 MMHG

## 2025-05-06 DIAGNOSIS — J45.21 INTERMITTENT ASTHMA WITH ACUTE EXACERBATION, UNSPECIFIED ASTHMA SEVERITY (HHS-HCC): Primary | ICD-10-CM

## 2025-05-06 PROCEDURE — 1159F MED LIST DOCD IN RCRD: CPT | Performed by: INTERNAL MEDICINE

## 2025-05-06 PROCEDURE — 99214 OFFICE O/P EST MOD 30 MIN: CPT | Performed by: INTERNAL MEDICINE

## 2025-05-06 PROCEDURE — 3074F SYST BP LT 130 MM HG: CPT | Performed by: INTERNAL MEDICINE

## 2025-05-06 PROCEDURE — 3078F DIAST BP <80 MM HG: CPT | Performed by: INTERNAL MEDICINE

## 2025-05-06 PROCEDURE — 1036F TOBACCO NON-USER: CPT | Performed by: INTERNAL MEDICINE

## 2025-05-06 PROCEDURE — 1160F RVW MEDS BY RX/DR IN RCRD: CPT | Performed by: INTERNAL MEDICINE

## 2025-05-06 ASSESSMENT — ENCOUNTER SYMPTOMS
COUGH: 0
JOINT SWELLING: 0
PALPITATIONS: 0
SLEEP DISTURBANCE: 0
NAUSEA: 0
SINUS PAIN: 0
AGITATION: 0
RHINORRHEA: 0
UNEXPECTED WEIGHT CHANGE: 0
DIFFICULTY URINATING: 0
CONSTIPATION: 0
EYE DISCHARGE: 0
FATIGUE: 0
FACIAL SWELLING: 0
TREMORS: 0
APNEA: 0
SPEECH DIFFICULTY: 0
NERVOUS/ANXIOUS: 0
ADENOPATHY: 0
ABDOMINAL PAIN: 0
CHOKING: 0
STRIDOR: 0
DYSURIA: 0
HEADACHES: 0
WHEEZING: 0
EYE REDNESS: 0
FREQUENCY: 0
SINUS PRESSURE: 0
DIZZINESS: 0
FEVER: 0
NUMBNESS: 0
BRUISES/BLEEDS EASILY: 0
SHORTNESS OF BREATH: 0
HEMATURIA: 0
LIGHT-HEADEDNESS: 0
WEAKNESS: 0
ABDOMINAL DISTENTION: 0
ARTHRALGIAS: 0

## 2025-05-06 NOTE — PROGRESS NOTES
@PULMONARY FOLLOW-UP@       PROBLEM:   asthma     ASSESSMENT:  The patient is a 74-year-old with a left bundle branch block, hypertension, vertigo, thyroid nodule and asthma.  She was hospitalized about a month or so ago for respiratory exacerbation of her asthma.  Her recent test outlined that she has a normal IgE with her eosinophil count being 300.  Her PFTs are normal as was her FeNO.  She seems to be doing quite well at the present time.  Her lungs are totally clear.    PLAN:  She will continue present medications and return in 3 months.      HISTORY OF PRESENT ILLNESS:  The patient is a 74-year-old with a history of hypertension, left bundle branch block sleep apnea thyroid nodules and vertigo. She also has a left bundle branch block and a history of asthma.  She is described shortness of breath for period time and has been on Symbicort.  It was reported that pulmonary function test from 2019 were normal as was his CT scan.  She is said to be a never smoker.    A calcium score from June 17, 2022 revealed a score of 0 in the lower lung fields were clear.  No adenopathy was noted.     A nuclear stress test from February 1, 2023 revealed no ischemia with a normal ventricle.  The right ventricle is normal.  An echocardiogram from January 31, 2023 revealed the following  - The left ventricle is normal in size. There is mild upper septal left   ventricular hypertrophy. Left ventricular systolic function is mildly decreased.   EF = 50 ± 5% (visual est.)   - Abnormal septal motion due to LBBB.   - The right ventricle is normal in size. Right ventricular systolic function is   normal.   - Normal left and right atrial size.   - No significant valve functional abnormalities.     On April 15, 2025 was noted, the patient reports that she has had asthma intermittently for many years but it only been borderline in degree.  However, she and her sister report that over the last year or so and it seems like whenever she travels  she gets respiratory tract infections.  She has been on more intensive therapies for asthma every several months of late.  At times she even has had pneumonia.  She started developing increasing respiratory symptoms about a week ago and was started on doxycycline thereafter she was admitted to the hospital for test several days and discharged 2 days ago.  She was discharged on prednisone 20 mg a day for 5 days.  She received IV corticosteroids during the hospital course at [facility] and her restless leg syndrome worsened.  She is a never smoker but did have secondhand smoke exposure from her mother who did die of lung cancer.  The patient had a cat in the past to which she was not allergic.  She does have sinus congestion and is not allergic to aspirin.  She has not had any occupational exposures that would cause asthma.  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  It is reported that her daughter has asthma.      On April 15, 2025 I summarized how the patient is a 74-year-old with a history of a left bundle branch block, hypertension, vertigo and thyroid nodules.  She has had mild intermittent asthma for the most part but over the last 6 months or  year she has had flaring every several months particularly in relationship to traveling.  She has only been using her Symbicort 80/4.5 only once a day.  She had increasing respiratory flaring last week which led to her being hospitalized for several days.  It is not clear if she has any eosinophilia or elevated IgE levels.  She has dry skin and possible eczema without aspirin allergy.  She does have a lot of sinus congestion.  Currently she is 80% better and has only a minimal wheeze on forced expiration.  Her Symbicort probably should be intensified and checking for eosinophilia and elevated IgE levels will be important when she is off her steroid.  Also probably obtaining complete PFTs to better characterize the extent of her airflow obstruction is important.  Her  recent chest x-ray reportedly was clear    Records from her recent hospitalization will be forwarded.  She will increase her Symbicort to 160/4.52 puffs twice daily and gargle after use.  Will check a respiratory allergy panel and CBC with differential when she has been off the steroids for about a week.  In addition we will obtain quantitative immunoglobulins because of her recurrent respiratory tract infection.  Will obtain complete PFTs and FeNO level.  She will come back in 2 to 3 weeks.    It was noted that lab test revealed that the absolute eosinophil count was 300 with the percentage of eosinophils being 2.8% and the white count 10.7.  Her total IgE level was 58 and her quantitative globulins were normal.    The PFTs from April 21, 2025 outlined an FVC of 2.13 L 94% predicted with an FEV1 of 1.54 L 88% addicted and FEV1 percent of 73%.  There was no significant bronchial response.  The TLC was 93% predicted the RV/TLC was 119% predicted the DLCO was 13.21 mL/min/mmHg above the lower limits of normal or 73% predicted.  The FeNO was normal at 14    Currently, the patient states that she feels that she has recovered from her illness.  She is using the Symbicort at a higher dose 160/4.5.  She is having no coughing congestion or wheezing.  She has no chest pains or pressures.  She worked outside in the yard yesterday and did okay.  The patient comes in to review all the findings.        RX Allergies[1]       Current Medications[2]          Review of Systems   Constitutional:  Negative for fatigue, fever and unexpected weight change.   HENT:  Negative for congestion, facial swelling, nosebleeds, postnasal drip, rhinorrhea, sinus pressure and sinus pain.    Eyes:  Negative for discharge, redness and visual disturbance.   Respiratory:  Negative for apnea, cough, choking, shortness of breath, wheezing and stridor.    Cardiovascular:  Negative for chest pain, palpitations and leg swelling.   Gastrointestinal:   Negative for abdominal distention, abdominal pain, constipation and nausea.   Endocrine: Negative for cold intolerance and heat intolerance.   Genitourinary:  Negative for difficulty urinating, dysuria, frequency and hematuria.   Musculoskeletal:  Negative for arthralgias, gait problem and joint swelling.   Allergic/Immunologic: Negative for environmental allergies, food allergies and immunocompromised state.   Neurological:  Negative for dizziness, tremors, syncope, speech difficulty, weakness, light-headedness, numbness and headaches.   Hematological:  Negative for adenopathy. Does not bruise/bleed easily.   Psychiatric/Behavioral:  Negative for agitation, behavioral problems and sleep disturbance. The patient is not nervous/anxious.         Vitals:    05/06/25 1112   BP: 125/61   Pulse: 78   Resp: 16   Temp: 36.6 °C (97.8 °F)   SpO2: 97%        Physical Exam  Vitals reviewed.   Constitutional:       Appearance: Normal appearance.   HENT:      Head: Normocephalic and atraumatic.   Eyes:      Extraocular Movements: Extraocular movements intact.   Cardiovascular:      Rate and Rhythm: Normal rate and regular rhythm.      Heart sounds: No murmur heard.     No friction rub. No gallop.   Pulmonary:      Effort: Pulmonary effort is normal. No respiratory distress.      Breath sounds: Normal breath sounds. No stridor. No wheezing, rhonchi or rales.   Chest:      Chest wall: No tenderness.   Abdominal:      General: Abdomen is flat. There is no distension.      Palpations: Abdomen is soft. There is no mass.      Tenderness: There is no abdominal tenderness.   Musculoskeletal:         General: Normal range of motion.      Cervical back: Normal range of motion.      Right lower leg: No edema.      Left lower leg: No edema.   Skin:     General: Skin is warm and dry.   Neurological:      Mental Status: She is alert and oriented to person, place, and time.   Psychiatric:         Mood and Affect: Mood normal.         Behavior:  Behavior normal.                 [1]   Allergies  Allergen Reactions    Levofloxacin Unknown   [2]   Current Outpatient Medications:     albuterol 2.5 mg /3 mL (0.083 %) nebulizer solution, Take 3 mL (2.5 mg) by nebulization every 4 hours if needed., Disp: , Rfl:     albuterol 90 mcg/actuation inhaler, Take 2 puffs by mouth 4 times a day as needed., Disp: , Rfl:     ascorbic acid-vitamin E-biotin (Hair, Skin, Nails with Biotin) 7.5-7.5-1,250 mg-unit-mcg tablet,chewable, Chew., Disp: , Rfl:     budesonide-formoterol (Breyna) 160-4.5 mcg/actuation inhaler, Inhale 2 puffs 2 times a day. Rinse mouth with water after use to reduce aftertaste and incidence of candidiasis. Do not swallow., Disp: 10.2 g, Rfl: 11    doxycycline (Vibramycin) 50 mg capsule, TAKE THREE CAPSULES BY MOUTH TWICE DAILY FOR 7 DAYS, Disp: , Rfl:     FLUoxetine (PROzac) 20 mg tablet, Take 1 tablet (20 mg) by mouth once daily., Disp: , Rfl:     fluticasone (Flonase Allergy Relief) 50 mcg/actuation nasal spray, Administer 1 spray into each nostril once daily., Disp: , Rfl:     hydrocortisone 2.5 % cream, APPLY 1 GRAM TOPICALLY TO THE AFFECTED AREA EVERY DAY AS DIRECTED, Disp: , Rfl:     LORazepam (Ativan) 1 mg tablet, Take 1 tablet (1 mg) by mouth every 6 hours if needed., Disp: , Rfl:     losartan (Cozaar) 25 mg tablet, Take 1 tablet (25 mg) by mouth once daily., Disp: , Rfl:     magnesium oxide-Mg AA chelate (Magnesium, oxide/AA chelate,) 300 mg capsule, Take 1 capsule (300 mg) by mouth once daily., Disp: , Rfl:     melatonin 5 mg tablet,chewable, Chew., Disp: , Rfl:     multivitamin with minerals iron-free (Centrum Silver), Take 1 tablet by mouth once daily., Disp: , Rfl:     pantoprazole (ProtoNix) 40 mg EC tablet, Take 1 tablet (40 mg) by mouth once daily., Disp: , Rfl:     predniSONE (Deltasone) 10 mg tablet, TAKE 4 TABLETS BY MOUTH DAILY FOR 3 DAYS  3 TABLETS DAILY FOR 3 DAYS  2 TABLETS DAILY FOR 3 DAYS  1 TABLET DAILY FOR 3 DAYS, Disp: , Rfl:      rOPINIRole (Requip) 1 mg tablet, Take 1 tablet (1 mg) by mouth once daily at bedtime., Disp: , Rfl:     fluticasone (Flonase) 50 mcg/actuation nasal spray, Administer 2 sprays into each nostril once daily. Shake gently. Before first use, prime pump. After use, clean tip and replace cap., Disp: 16 g, Rfl: 3

## 2025-05-06 NOTE — PATIENT INSTRUCTIONS
You are doing quite well at the present time and your recent pulmonary functions were normal.  I would continue with the Symbicort and come back in 3 months.

## 2025-05-12 ENCOUNTER — APPOINTMENT (OUTPATIENT)
Dept: RESPIRATORY THERAPY | Facility: HOSPITAL | Age: 75
End: 2025-05-12
Payer: MEDICARE

## 2025-05-19 PROBLEM — M47.814 SPONDYLOSIS WITHOUT MYELOPATHY OR RADICULOPATHY, THORACIC REGION: Status: ACTIVE | Noted: 2018-10-09

## 2025-05-19 PROBLEM — R05.1 ACUTE COUGH: Status: ACTIVE | Noted: 2024-01-02

## 2025-05-19 PROBLEM — G25.81 RESTLESS LEGS SYNDROME: Status: ACTIVE | Noted: 2025-04-11

## 2025-05-19 PROBLEM — I51.9 LEFT VENTRICULAR DYSFUNCTION: Status: ACTIVE | Noted: 2023-01-31

## 2025-05-19 PROBLEM — E55.9 VITAMIN D DEFICIENCY, UNSPECIFIED: Status: ACTIVE | Noted: 2023-10-11

## 2025-05-19 PROBLEM — H90.3 SENSORINEURAL HEARING LOSS (SNHL) OF BOTH EARS: Status: ACTIVE | Noted: 2025-05-19
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
## 2025-05-19 PROBLEM — R42 VERTIGO: Status: ACTIVE | Noted: 2024-06-11

## 2025-05-19 PROBLEM — R41.0 DISORIENTATION, UNSPECIFIED: Status: ACTIVE | Noted: 2024-09-16

## 2025-05-19 PROBLEM — E78.00 PURE HYPERCHOLESTEROLEMIA, UNSPECIFIED: Status: ACTIVE | Noted: 2022-10-26

## 2025-05-19 PROBLEM — N39.0 URINARY TRACT INFECTION, SITE NOT SPECIFIED: Status: ACTIVE | Noted: 2024-04-12

## 2025-05-19 PROBLEM — Z98.1 ARTHRODESIS STATUS: Status: ACTIVE | Noted: 2018-10-09

## 2025-05-19 PROBLEM — Z98.890 OTHER SPECIFIED POSTPROCEDURAL STATES: Status: ACTIVE | Noted: 2018-10-09

## 2025-05-19 PROBLEM — R25.2 CRAMP AND SPASM: Status: ACTIVE | Noted: 2023-11-30

## 2025-05-19 PROBLEM — B34.9 VIRAL INFECTION, UNSPECIFIED: Status: ACTIVE | Noted: 2022-04-19

## 2025-05-19 PROBLEM — R44.1 VISUAL HALLUCINATIONS: Status: ACTIVE | Noted: 2023-11-30

## 2025-05-19 RX ORDER — CODEINE PHOSPHATE AND GUAIFENESIN 10; 100 MG/5ML; MG/5ML
SOLUTION ORAL
COMMUNITY

## 2025-05-19 RX ORDER — IPRATROPIUM BROMIDE 0.5 MG/2.5ML
SOLUTION RESPIRATORY (INHALATION)
COMMUNITY
Start: 2025-04-11

## 2025-05-19 RX ORDER — NITROFURANTOIN 25; 75 MG/1; MG/1
100 CAPSULE ORAL 2 TIMES DAILY
COMMUNITY

## 2025-05-19 RX ORDER — TOPIRAMATE 25 MG/1
25 TABLET, FILM COATED ORAL 2 TIMES DAILY
COMMUNITY

## 2025-05-19 RX ORDER — VIT C/E/ZN/COPPR/LUTEIN/ZEAXAN 250MG-90MG
CAPSULE ORAL
COMMUNITY
Start: 2025-04-11

## 2025-05-19 RX ORDER — POLYETHYLENE GLYCOL 3350 17 G/17G
POWDER, FOR SOLUTION ORAL
COMMUNITY

## 2025-05-19 RX ORDER — CEPHALEXIN 500 MG/1
CAPSULE ORAL
COMMUNITY

## 2025-08-04 ENCOUNTER — APPOINTMENT (OUTPATIENT)
Dept: PULMONOLOGY | Facility: CLINIC | Age: 75
End: 2025-08-04
Payer: MEDICARE

## 2025-08-05 PROBLEM — F33.1 MODERATE RECURRENT MAJOR DEPRESSION: Status: RESOLVED | Noted: 2020-01-27 | Resolved: 2025-08-05

## 2025-08-05 PROBLEM — I51.9 LEFT VENTRICULAR DYSFUNCTION: Status: RESOLVED | Noted: 2023-01-31 | Resolved: 2025-08-05

## 2025-08-05 PROBLEM — R41.0 DISORIENTATION, UNSPECIFIED: Status: RESOLVED | Noted: 2024-09-16 | Resolved: 2025-08-05

## 2025-08-05 RX ORDER — CLONAZEPAM 1 MG/1
1 TABLET ORAL NIGHTLY
COMMUNITY
Start: 2025-06-10

## 2025-08-06 ENCOUNTER — OFFICE VISIT (OUTPATIENT)
Dept: PULMONOLOGY | Facility: CLINIC | Age: 75
End: 2025-08-06
Payer: MEDICARE

## 2025-08-06 VITALS
TEMPERATURE: 97.5 F | WEIGHT: 149 LBS | OXYGEN SATURATION: 97 % | DIASTOLIC BLOOD PRESSURE: 69 MMHG | RESPIRATION RATE: 18 BRPM | SYSTOLIC BLOOD PRESSURE: 136 MMHG | BODY MASS INDEX: 29.1 KG/M2 | HEART RATE: 75 BPM

## 2025-08-06 DIAGNOSIS — J45.21 INTERMITTENT ASTHMA WITH ACUTE EXACERBATION, UNSPECIFIED ASTHMA SEVERITY (HHS-HCC): Primary | ICD-10-CM

## 2025-08-06 PROCEDURE — 3078F DIAST BP <80 MM HG: CPT | Performed by: INTERNAL MEDICINE

## 2025-08-06 PROCEDURE — 99213 OFFICE O/P EST LOW 20 MIN: CPT | Performed by: INTERNAL MEDICINE

## 2025-08-06 PROCEDURE — 1160F RVW MEDS BY RX/DR IN RCRD: CPT | Performed by: INTERNAL MEDICINE

## 2025-08-06 PROCEDURE — 1159F MED LIST DOCD IN RCRD: CPT | Performed by: INTERNAL MEDICINE

## 2025-08-06 PROCEDURE — 3075F SYST BP GE 130 - 139MM HG: CPT | Performed by: INTERNAL MEDICINE

## 2025-08-06 PROCEDURE — 1036F TOBACCO NON-USER: CPT | Performed by: INTERNAL MEDICINE

## 2025-08-06 PROCEDURE — 99212 OFFICE O/P EST SF 10 MIN: CPT | Performed by: INTERNAL MEDICINE

## 2025-08-06 ASSESSMENT — ENCOUNTER SYMPTOMS
SPEECH DIFFICULTY: 0
UNEXPECTED WEIGHT CHANGE: 0
CONSTIPATION: 0
SHORTNESS OF BREATH: 0
ADENOPATHY: 0
EYE DISCHARGE: 0
NAUSEA: 0
ABDOMINAL PAIN: 0
FEVER: 0
DIZZINESS: 0
WHEEZING: 0
LIGHT-HEADEDNESS: 0
SINUS PAIN: 0
NERVOUS/ANXIOUS: 0
HEMATURIA: 0
SLEEP DISTURBANCE: 0
DIFFICULTY URINATING: 0
AGITATION: 0
COUGH: 0
DYSURIA: 0
RHINORRHEA: 0
ARTHRALGIAS: 0
EYE REDNESS: 0
NUMBNESS: 0
APNEA: 0
FREQUENCY: 0
ABDOMINAL DISTENTION: 0
BRUISES/BLEEDS EASILY: 0
PALPITATIONS: 0
WEAKNESS: 0
FATIGUE: 0
TREMORS: 0
HEADACHES: 0
STRIDOR: 0
SINUS PRESSURE: 0
CHOKING: 0
JOINT SWELLING: 0
FACIAL SWELLING: 0

## 2025-08-06 NOTE — PROGRESS NOTES
@PULMONARY FOLLOW-UP@       PROBLEM:  asthma     ASSESSMENT:  The patient is a 74-year-old with left bundle branch block hypertension vertigo thyroid nodule, asthma.  Her respiratory status has been stable.  She seems controlled on her current regimen with Advair.    PLAN:  The patient will continue present medications and return in 6 months.      HISTORY OF PRESENT ILLNESS:  The patient is a 74-year-old with a history of hypertension, left bundle branch block sleep apnea thyroid nodules and vertigo. She also has a left bundle branch block and a history of asthma.  She is described shortness of breath for period time and has been on Symbicort.  It was reported that pulmonary function test from 2019 were normal as was his CT scan.  She is said to be a never smoker.     A calcium score from June 17, 2022 revealed a score of 0 in the lower lung fields were clear.  No adenopathy was noted.     A nuclear stress test from February 1, 2023 revealed no ischemia with a normal ventricle.  The right ventricle is normal.    An echocardiogram from January 31, 2023 revealed the following  - The left ventricle is normal in size. There is mild upper septal left   ventricular hypertrophy. Left ventricular systolic function is mildly decreased.   EF = 50 ± 5% (visual est.)   - Abnormal septal motion due to LBBB.   - The right ventricle is normal in size. Right ventricular systolic function is   normal.   - Normal left and right atrial size.   - No significant valve functional abnormalities.      On April 15, 2025 was noted, the patient reports that she has had asthma intermittently for many years but it only been borderline in degree.  However, she and her sister report that over the last year or so and it seems like whenever she travels she gets respiratory tract infections.  She has been on more intensive therapies for asthma every several months of late.  At times she even has had pneumonia.  She started developing increasing  respiratory symptoms about a week ago and was started on doxycycline thereafter she was admitted to the hospital for test several days and discharged 2 days ago.  She was discharged on prednisone 20 mg a day for 5 days.  She received IV corticosteroids during the hospital course at [facility] and her restless leg syndrome worsened.  She is a never smoker but did have secondhand smoke exposure from her mother who did die of lung cancer.  The patient had a cat in the past to which she was not allergic.  She does have sinus congestion and is not allergic to aspirin.  She has not had any occupational exposures that would cause asthma.  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  It is reported that her daughter has asthma.       On April 15, 2025 I summarized how the patient is a 74-year-old with a history of a left bundle branch block, hypertension, vertigo and thyroid nodules.  She has had mild intermittent asthma for the most part but over the last 6 months or  year she has had flaring every several months particularly in relationship to traveling.  She has only been using her Symbicort 80/4.5 only once a day.  She had increasing respiratory flaring last week which led to her being hospitalized for several days.  It is not clear if she has any eosinophilia or elevated IgE levels.  She has dry skin and possible eczema without aspirin allergy.  She does have a lot of sinus congestion.  Currently she is 80% better and has only a minimal wheeze on forced expiration.  Her Symbicort probably should be intensified and checking for eosinophilia and elevated IgE levels will be important when she is off her steroid.  Also probably obtaining complete PFTs to better characterize the extent of her airflow obstruction is important.  Her recent chest x-ray reportedly was clear     Records from her recent hospitalization will be forwarded.  She will increase her Symbicort to 160/4.52 puffs twice daily and gargle after use.  Will  check a respiratory allergy panel and CBC with differential when she has been off the steroids for about a week.  In addition we will obtain quantitative immunoglobulins because of her recurrent respiratory tract infection.  Will obtain complete PFTs and FeNO level.  She will come back in 2 to 3 weeks.     It was noted that lab test revealed that the absolute eosinophil count was 300 with the percentage of eosinophils being 2.8% and the white count 10.7.  Her total IgE level was 58 and her quantitative globulins were normal.     The PFTs from April 21, 2025 outlined an FVC of 2.13 L 94% predicted with an FEV1 of 1.54 L 88% addicted and FEV1 percent of 73%.  There was no significant bronchial response.  The TLC was 93% predicted the RV/TLC was 119% predicted the DLCO was 13.21 mL/min/mmHg above the lower limits of normal or 73% predicted.  The FeNO was normal at 14    On May 6, 2025 was noted, the patient states that she feels that she has recovered from her illness.  She is using the Symbicort at a higher dose 160/4.5.  She is having no coughing congestion or wheezing.  She has no chest pains or pressures.  She worked outside in the yard yesterday and did okay.  The patient comes in to review all the findings.      I summarized on May 6, 2025 the patient is a 74-year-old with a left bundle branch block, hypertension, vertigo, thyroid nodule and asthma.  She was hospitalized about a month or so ago for respiratory exacerbation of her asthma.  Her recent test outlined that she has a normal IgE with her eosinophil count being 300.  Her PFTs are normal as was her FeNO.  She seems to be doing quite well at the present time.  Her lungs are totally clear.    Currently, the patient is having no coughing congestion chest pains pressures etc.  She has required the use of albuterol only once since last being seen.  She notes some shortness of breath if she bends over or rushes but otherwise is doing fine.    RX Allergies[1]        Current Medications[2]          Review of Systems   Constitutional:  Negative for fatigue, fever and unexpected weight change.   HENT:  Negative for congestion, facial swelling, nosebleeds, postnasal drip, rhinorrhea, sinus pressure and sinus pain.    Eyes:  Negative for discharge, redness and visual disturbance.   Respiratory:  Negative for apnea, cough, choking, shortness of breath, wheezing and stridor.    Cardiovascular:  Negative for chest pain, palpitations and leg swelling.   Gastrointestinal:  Negative for abdominal distention, abdominal pain, constipation and nausea.   Endocrine: Negative for cold intolerance and heat intolerance.   Genitourinary:  Negative for difficulty urinating, dysuria, frequency and hematuria.   Musculoskeletal:  Negative for arthralgias, gait problem and joint swelling.   Allergic/Immunologic: Negative for environmental allergies, food allergies and immunocompromised state.   Neurological:  Negative for dizziness, tremors, syncope, speech difficulty, weakness, light-headedness, numbness and headaches.   Hematological:  Negative for adenopathy. Does not bruise/bleed easily.   Psychiatric/Behavioral:  Negative for agitation, behavioral problems and sleep disturbance. The patient is not nervous/anxious.         Vitals:    08/06/25 1118   BP: 136/69   Pulse: 75   Resp: 18   Temp: 36.4 °C (97.5 °F)   SpO2: 97%        Physical Exam  Vitals reviewed.   Constitutional:       Appearance: Normal appearance.   HENT:      Head: Normocephalic and atraumatic.     Eyes:      Extraocular Movements: Extraocular movements intact.       Cardiovascular:      Rate and Rhythm: Normal rate and regular rhythm.      Heart sounds: No murmur heard.     No friction rub. No gallop.   Pulmonary:      Effort: Pulmonary effort is normal. No respiratory distress.      Breath sounds: Normal breath sounds. No stridor. No wheezing, rhonchi or rales.   Chest:      Chest wall: No tenderness.   Abdominal:       General: Abdomen is flat. There is no distension.      Palpations: Abdomen is soft. There is no mass.      Tenderness: There is no abdominal tenderness.     Musculoskeletal:         General: Normal range of motion.      Cervical back: Normal range of motion.      Right lower leg: No edema.      Left lower leg: No edema.     Skin:     General: Skin is warm and dry.     Neurological:      Mental Status: She is alert and oriented to person, place, and time.     Psychiatric:         Mood and Affect: Mood normal.         Behavior: Behavior normal.               [1]   Allergies  Allergen Reactions    Levofloxacin Unknown   [2]   Current Outpatient Medications:     albuterol 2.5 mg /3 mL (0.083 %) nebulizer solution, Take 3 mL (2.5 mg) by nebulization every 4 hours if needed., Disp: , Rfl:     albuterol 90 mcg/actuation inhaler, Take 2 puffs by mouth 4 times a day as needed., Disp: , Rfl:     ascorbic acid-vitamin E-biotin (Hair, Skin, Nails with Biotin) 7.5-7.5-1,250 mg-unit-mcg tablet,chewable, Chew., Disp: , Rfl:     budesonide-formoterol (Breyna) 160-4.5 mcg/actuation inhaler, Inhale 2 puffs 2 times a day. Rinse mouth with water after use to reduce aftertaste and incidence of candidiasis. Do not swallow., Disp: 10.2 g, Rfl: 11    clonazePAM (KlonoPIN) 1 mg tablet, Take 1 tablet (1 mg) by mouth once daily at bedtime., Disp: , Rfl:     FLUoxetine (PROzac) 20 mg tablet, Take 1 tablet (20 mg) by mouth once daily., Disp: , Rfl:     losartan (Cozaar) 25 mg tablet, Take 1 tablet (25 mg) by mouth once daily., Disp: , Rfl:     melatonin 5 mg tablet,chewable, Chew., Disp: , Rfl:     multivitamin with minerals iron-free (Centrum Silver), Take 1 tablet by mouth once daily., Disp: , Rfl:     rOPINIRole (Requip) 1 mg tablet, Take 1 tablet (1 mg) by mouth once daily at bedtime., Disp: , Rfl:     fluticasone (Flonase) 50 mcg/actuation nasal spray, Administer 2 sprays into each nostril once daily. Shake gently. Before first use, prime  pump. After use, clean tip and replace cap., Disp: 16 g, Rfl: 3